# Patient Record
Sex: MALE | Race: WHITE | NOT HISPANIC OR LATINO
[De-identification: names, ages, dates, MRNs, and addresses within clinical notes are randomized per-mention and may not be internally consistent; named-entity substitution may affect disease eponyms.]

---

## 2020-11-14 ENCOUNTER — TRANSCRIPTION ENCOUNTER (OUTPATIENT)
Age: 30
End: 2020-11-14

## 2021-05-10 ENCOUNTER — OUTPATIENT (OUTPATIENT)
Dept: OUTPATIENT SERVICES | Facility: HOSPITAL | Age: 31
LOS: 1 days | Discharge: HOME | End: 2021-05-10

## 2021-05-10 DIAGNOSIS — Z11.59 ENCOUNTER FOR SCREENING FOR OTHER VIRAL DISEASES: ICD-10-CM

## 2021-09-05 ENCOUNTER — TRANSCRIPTION ENCOUNTER (OUTPATIENT)
Age: 31
End: 2021-09-05

## 2021-09-29 ENCOUNTER — TRANSCRIPTION ENCOUNTER (OUTPATIENT)
Age: 31
End: 2021-09-29

## 2021-10-02 ENCOUNTER — TRANSCRIPTION ENCOUNTER (OUTPATIENT)
Age: 31
End: 2021-10-02

## 2021-10-21 PROBLEM — Z00.00 ENCOUNTER FOR PREVENTIVE HEALTH EXAMINATION: Status: ACTIVE | Noted: 2021-10-21

## 2021-11-08 ENCOUNTER — APPOINTMENT (OUTPATIENT)
Dept: OTOLARYNGOLOGY | Facility: CLINIC | Age: 31
End: 2021-11-08
Payer: OTHER GOVERNMENT

## 2021-11-08 VITALS — WEIGHT: 190 LBS | HEIGHT: 74 IN | BODY MASS INDEX: 24.38 KG/M2

## 2021-11-08 DIAGNOSIS — H93.13 TINNITUS, BILATERAL: ICD-10-CM

## 2021-11-08 PROCEDURE — 99203 OFFICE O/P NEW LOW 30 MIN: CPT | Mod: 25

## 2021-11-08 PROCEDURE — 92557 COMPREHENSIVE HEARING TEST: CPT

## 2021-11-08 PROCEDURE — 92550 TYMPANOMETRY & REFLEX THRESH: CPT

## 2021-11-08 RX ORDER — BIOFLAV,LEMON/VIT BCOMP,C
TABLET ORAL DAILY
Qty: 30 | Refills: 0 | Status: ACTIVE | COMMUNITY
Start: 2021-11-08 | End: 1900-01-01

## 2021-11-08 NOTE — ASSESSMENT
[FreeTextEntry1] : Tinnitus management was discussed including diet, environmental factors, medications, herbal supplements, and hearing aids.\par

## 2022-02-12 ENCOUNTER — TRANSCRIPTION ENCOUNTER (OUTPATIENT)
Age: 32
End: 2022-02-12